# Patient Record
Sex: MALE | Race: NATIVE HAWAIIAN OR OTHER PACIFIC ISLANDER | NOT HISPANIC OR LATINO | URBAN - METROPOLITAN AREA
[De-identification: names, ages, dates, MRNs, and addresses within clinical notes are randomized per-mention and may not be internally consistent; named-entity substitution may affect disease eponyms.]

---

## 2023-03-21 ENCOUNTER — OFFICE (OUTPATIENT)
Dept: URBAN - METROPOLITAN AREA CLINIC 67 | Facility: CLINIC | Age: 38
End: 2023-03-21

## 2023-03-21 VITALS — SYSTOLIC BLOOD PRESSURE: 110 MMHG | DIASTOLIC BLOOD PRESSURE: 78 MMHG | HEIGHT: 70 IN | WEIGHT: 161 LBS

## 2023-03-21 DIAGNOSIS — Z83.79 FAMILY HISTORY OF ULCERATIVE COLITIS: ICD-10-CM

## 2023-03-21 DIAGNOSIS — R12 HEARTBURN: ICD-10-CM

## 2023-03-21 DIAGNOSIS — R14.0 ABDOMINAL BLOATING: ICD-10-CM

## 2023-03-21 DIAGNOSIS — R14.1 GAS PAIN: ICD-10-CM

## 2023-03-21 DIAGNOSIS — R14.3 FLATULENCE: ICD-10-CM

## 2023-03-21 PROCEDURE — 99243 OFF/OP CNSLTJ NEW/EST LOW 30: CPT | Performed by: NURSE PRACTITIONER

## 2023-03-21 NOTE — SERVICEHPINOTES
This is a   37   year old  male   seen   in consultation at the request of Dr. CROW PARK  . Referred to Dr Rivas. Patient reports upset stomach/heartburn, bloating and significant flatulence for 1.5 months. Treated empirically for h. pylori by PCP w/ 2 weeks of Amox/Clarithro/Omeprazole wo improvement in sx. Tells me he has been drinking a vegan protein shake every day x1 yr after the gym  and last week it occurred to him that when not working out on the weekends he is asymptomatic. Vegan patties caused the same sx. Had one episode of diarrhea over these 1.5 months since symptomatic.
brTells me he had a sensitive Gi tract all his life and mother has UC.